# Patient Record
Sex: MALE | Race: WHITE | NOT HISPANIC OR LATINO | Employment: OTHER | URBAN - METROPOLITAN AREA
[De-identification: names, ages, dates, MRNs, and addresses within clinical notes are randomized per-mention and may not be internally consistent; named-entity substitution may affect disease eponyms.]

---

## 2021-01-01 ENCOUNTER — IMMUNIZATION (OUTPATIENT)
Dept: PHARMACY | Facility: CLINIC | Age: 81
End: 2021-01-01

## 2021-01-01 DIAGNOSIS — Z23 NEED FOR VACCINATION: Primary | ICD-10-CM

## 2021-01-15 ENCOUNTER — IMMUNIZATION (OUTPATIENT)
Dept: PHARMACY | Facility: CLINIC | Age: 81
End: 2021-01-15

## 2021-01-15 DIAGNOSIS — Z23 NEED FOR VACCINATION: Primary | ICD-10-CM

## 2022-01-01 ENCOUNTER — HOSPITAL ENCOUNTER (EMERGENCY)
Facility: HOSPITAL | Age: 82
End: 2022-01-21
Attending: EMERGENCY MEDICINE
Payer: MEDICARE

## 2022-01-01 DIAGNOSIS — I46.9 CARDIOPULMONARY ARREST: Primary | ICD-10-CM

## 2022-01-01 PROCEDURE — 99285 EMERGENCY DEPT VISIT HI MDM: CPT | Mod: 25

## 2022-01-01 PROCEDURE — 92950 HEART/LUNG RESUSCITATION CPR: CPT

## 2022-01-01 PROCEDURE — 63600175 PHARM REV CODE 636 W HCPCS: Performed by: EMERGENCY MEDICINE

## 2022-01-01 RX ORDER — EPINEPHRINE 0.1 MG/ML
INJECTION INTRAVENOUS CODE/TRAUMA/SEDATION MEDICATION
Status: COMPLETED | OUTPATIENT
Start: 2022-01-01 | End: 2022-01-01

## 2022-01-01 RX ADMIN — EPINEPHRINE 1 MG: 0.1 INJECTION, SOLUTION ENDOTRACHEAL; INTRACARDIAC; INTRAVENOUS at 08:01

## 2022-01-21 NOTE — ED PROVIDER NOTES
SCRIBE #1 NOTE: I, Kevyn Godinez, am scribing for, and in the presence of, Bev Lemus MD. I have scribed the entire note.      History      Chief Complaint   Patient presents with    Cardiac Arrest     Cardiac arrest. Epi x 3 in route       Review of patient's allergies indicates:  No Known Allergies     HPI   HPI    1/21/2022, 8:28 AM   History obtained from EMS  HPI and ROS limited, as pt is unresponsive      History of Present Illness: Salinas Novoa is a 81 y.o. male patient who presents to the Emergency Department for cardiac arrest, onset 20 minutes PTA. Per EMS, pt's wife states that the pt had vomited 1.5 hours ago, then became unresponsive. Fire Department arrived before EMS, and found the pt unresponsive with agonal respirations (pt had a pulse at that time). Pt's condition did not change upon EMS arrival, but pt went into PEA just before he was transferred into the ambulance. CPR was initiated, and pt was given 3 rounds of epi and had was intubated en route. Symptoms are constant and severe. No associated sxs reported. No further complaints or concerns at this time.     Arrival mode: EMS    PCP: Primary Doctor No       Past Medical History:  Past Medical History:   Diagnosis Date    Constipation     Diabetes mellitus     GI bleed     Hypotension        Past Surgical History:  History reviewed. No pertinent surgical history.      Family History:  History reviewed. No pertinent family history.    Social History:  Social History     Tobacco Use    Smoking status: Former Smoker     Types: Cigarettes    Smokeless tobacco: Never Used   Substance and Sexual Activity    Alcohol use: Yes     Comment: occassionally    Drug use: Never    Sexual activity: Not on file       ROS   Review of Systems   Unable to perform ROS: Patient unresponsive     Physical Exam      Initial Vitals [01/21/22 0822]   BP Pulse Resp Temp SpO2   -- (!) 0 (!) 0 -- --      MAP       --          Physical Exam  Physical exam is  limited due to the patient's condition.   General: Patient is unresponsive. Small amount of orange-tinged vomit noted on pt/  Head: Atraumatic Normocephalic.   Eyes: Pupils measure 3 mm and are non-reactive.  ENT: Airway patent. ETT in place.   Cardiovascular: No cardiac activity.  Respiratory: No spontaneous respirations.   Abdominal: No distension   Musculoskeletal: No deformities   Skin: Pale   Neurological: Unresponsive to verbal and painful stimuli. Full neuro exam limited due to patient's condition    ED Course    Critical Care    Date/Time: 1/21/2022 8:48 AM  Performed by: Bev Lemus MD  Authorized by: Bev Lemus MD   Direct patient critical care time: 15 minutes  Additional history critical care time: 10 minutes  Consult with family critical care time: 10 minutes  Total critical care time (exclusive of procedural time) : 35 minutes  Critical care time was exclusive of separately billable procedures and treating other patients and teaching time.  Critical care was necessary to treat or prevent imminent or life-threatening deterioration of the following conditions: Cardiac arrest.  Critical care was time spent personally by me on the following activities: development of treatment plan with patient or surrogate, interpretation of cardiac output measurements, evaluation of patient's response to treatment, examination of patient, obtaining history from patient or surrogate, ordering and performing treatments and interventions, pulse oximetry and re-evaluation of patient's condition.        ED Vital Signs:  Vitals:    01/21/22 0822   Pulse: (!) 0   Resp: (!) 0       Abnormal Lab Results:  Labs Reviewed - No data to display     Imaging Results:  Imaging Results    None                 The Emergency Provider reviewed the vital signs and test results, which are outlined above.    ED Discussion     8:23 AM: Pt has arrived to the ED at this time. Pt has no pulse at this time. CPR resumed at this  time.    8:32 AM: Discussed case with pt's wife. Wife states that the pt had complained of abdominal pain and back pain last night, and had taken Dulcolax before going back to sleep. Pt had been feeling better afterwards, but his pain returned early this morning. Wife found the pt unresponsive this morning, and subsequently called 911.    8:36 AM: Pt's wife is at bedside. Pt has been given 3 rounds of epi in the ED. Pt has no palpable pulse or cardiac activity to auscultation. Wife has decided to make the pt DNR and cease CPR. TOD called at this time.         ED Medication(s):  Medications   EPINEPHrine 0.1 mg/mL injection (1 mg Intravenous Given 22)   EPINEPHrine 0.1 mg/mL injection (1 mg Intravenous Given 2232)          There are no discharge medications for this patient.        Medical Decision Making              Scribe Attestation:   Scribe #1: I performed the above scribed service and the documentation accurately describes the services I performed. I attest to the accuracy of the note.    Attending:   Physician Attestation Statement for Scribe #1: I, Bev Lemus MD, personally performed the services described in this documentation, as scribed by Kevyn Godinez, in my presence, and it is both accurate and complete.          Clinical Impression       ICD-10-CM ICD-9-CM   1. Cardiopulmonary arrest  I46.9 427.5       Disposition:   Disposition:          Bev Lemus MD  [id number]

## 2022-01-21 NOTE — ED NOTES
Call placed to [facility]. Spoke with Andreia Rajan. Pt ruled out due to age.   Referral number [id number]

## 2022-01-21 NOTE — ED NOTES
Pts spouse informed we are still awaiting release of body from  and will be called with any updates. [phone]    Pts belongings given to spouse.   1 pair jeans,   1 pair shoes, socks,  1 belt  Pocket knife.     Informed insurance care was not presented with EMS arrival. Only drivers license. Calls placed to [facility] and EMS to locate insurance card.

## 2022-01-21 NOTE — ED NOTES
Call placed to coroners office for update. Reports pts body released to [facility] at this time.

## 2022-01-21 NOTE — ED NOTES
Call received from , Imani Reddy. Requests face sheet, H&P, Death note, and rhythm strip. Will send shortly.